# Patient Record
Sex: MALE | Race: WHITE | URBAN - METROPOLITAN AREA
[De-identification: names, ages, dates, MRNs, and addresses within clinical notes are randomized per-mention and may not be internally consistent; named-entity substitution may affect disease eponyms.]

---

## 2021-06-29 ENCOUNTER — OFFICE VISIT (OUTPATIENT)
Dept: URBAN - METROPOLITAN AREA CLINIC 85 | Facility: CLINIC | Age: 71
End: 2021-06-29
Payer: COMMERCIAL

## 2021-06-29 DIAGNOSIS — D23.0 OTHER BENIGN NEOPLASM OF SKIN OF LIP: Primary | ICD-10-CM

## 2021-06-29 DIAGNOSIS — H25.13 AGE-RELATED NUCLEAR CATARACT, BILATERAL: ICD-10-CM

## 2021-06-29 PROCEDURE — 92002 INTRM OPH EXAM NEW PATIENT: CPT | Performed by: OPHTHALMOLOGY

## 2021-06-29 ASSESSMENT — INTRAOCULAR PRESSURE
OS: 15
OD: 14

## 2021-06-29 NOTE — IMPRESSION/PLAN
Impression: Age-related nuclear cataract, bilateral: H25.13. Plan: Discussed findings. Discussed option of CE/IOL OU. Patient understands cataract effect on vision. Patient defers to have  CE w/IOL consult with Dr. Zach Washburn or Dr. Faiza Armendariz at this time. Continue to monitor. RTC 1 year CEE or ASAP if decreased VA or pain.

## 2021-07-02 ENCOUNTER — PROCEDURE (OUTPATIENT)
Dept: URBAN - METROPOLITAN AREA CLINIC 85 | Facility: CLINIC | Age: 71
End: 2021-07-02
Payer: MEDICARE

## 2021-07-02 PROCEDURE — 11440 EXC FACE-MM B9+MARG 0.5 CM/<: CPT | Performed by: OPHTHALMOLOGY

## 2021-07-02 NOTE — IMPRESSION/PLAN
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 Plan: The patient presents for excision of inclusion cyst in RUL. The patient was informed that no after care would be necessary.

## 2023-09-15 NOTE — IMPRESSION/PLAN
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 Plan: Discussed findings with patient papilloma RUL. Educated patient on recommended surgical excision with  and treatment options. Pt elects surgical approach with excision. REC no NSAID x2 weeks and if on any blood thinners D.C 5 days prior. Home Suture Removal Text: Patient was provided instructions on removing sutures and will remove their sutures at home.  If they have any questions or difficulties they will call the office.